# Patient Record
Sex: FEMALE | Race: WHITE | NOT HISPANIC OR LATINO | Employment: OTHER | URBAN - METROPOLITAN AREA
[De-identification: names, ages, dates, MRNs, and addresses within clinical notes are randomized per-mention and may not be internally consistent; named-entity substitution may affect disease eponyms.]

---

## 2024-06-05 ENCOUNTER — OFFICE VISIT (OUTPATIENT)
Dept: ORTHOPEDIC SURGERY | Facility: CLINIC | Age: 69
End: 2024-06-05
Payer: MEDICARE

## 2024-06-05 ENCOUNTER — CLINICAL SUPPORT (OUTPATIENT)
Dept: ORTHOPEDIC SURGERY | Facility: CLINIC | Age: 69
End: 2024-06-05
Payer: MEDICARE

## 2024-06-05 DIAGNOSIS — M79.672 FOOT PAIN, LEFT: ICD-10-CM

## 2024-06-05 DIAGNOSIS — S92.215A NONDISPLACED FRACTURE OF CUBOID BONE OF LEFT FOOT, INITIAL ENCOUNTER FOR CLOSED FRACTURE: Primary | ICD-10-CM

## 2024-06-05 PROCEDURE — 99204 OFFICE O/P NEW MOD 45 MIN: CPT | Performed by: FAMILY MEDICINE

## 2024-06-05 PROCEDURE — 1159F MED LIST DOCD IN RCRD: CPT | Performed by: FAMILY MEDICINE

## 2024-06-05 PROCEDURE — 1125F AMNT PAIN NOTED PAIN PRSNT: CPT | Performed by: FAMILY MEDICINE

## 2024-06-05 RX ORDER — ALBUTEROL SULFATE 90 UG/1
1 AEROSOL, METERED RESPIRATORY (INHALATION) AS NEEDED
COMMUNITY

## 2024-06-05 RX ORDER — VALACYCLOVIR HYDROCHLORIDE 1 G/1
500 TABLET, FILM COATED ORAL AS NEEDED
COMMUNITY
Start: 2023-10-24

## 2024-06-05 RX ORDER — CHLORHEXIDINE GLUCONATE ORAL RINSE 1.2 MG/ML
15 SOLUTION DENTAL AS NEEDED
COMMUNITY
Start: 2023-10-19

## 2024-06-05 RX ORDER — THEOPHYLLINE 600 MG/1
600 TABLET, EXTENDED RELEASE ORAL DAILY
COMMUNITY

## 2024-06-05 RX ORDER — THEOPHYLLINE 400 MG/1
400 TABLET, EXTENDED RELEASE ORAL DAILY
COMMUNITY
Start: 2024-03-29

## 2024-06-05 RX ORDER — LOSARTAN POTASSIUM 100 MG/1
100 TABLET ORAL DAILY
COMMUNITY
Start: 2024-04-24

## 2024-06-05 RX ORDER — HYDRALAZINE HYDROCHLORIDE 25 MG/1
25 TABLET, FILM COATED ORAL 2 TIMES DAILY
COMMUNITY

## 2024-06-05 RX ORDER — AMLODIPINE BESYLATE 10 MG/1
10 TABLET ORAL DAILY
COMMUNITY

## 2024-06-05 RX ORDER — NYSTATIN 100000 U/G
1 OINTMENT TOPICAL AS NEEDED
COMMUNITY

## 2024-06-05 RX ORDER — DULOXETIN HYDROCHLORIDE 60 MG/1
60 CAPSULE, DELAYED RELEASE ORAL DAILY
COMMUNITY

## 2024-06-05 RX ORDER — ACETAMINOPHEN 325 MG/1
325 TABLET ORAL AS NEEDED
COMMUNITY

## 2024-06-05 RX ORDER — ATORVASTATIN CALCIUM 20 MG/1
20 TABLET, FILM COATED ORAL NIGHTLY
COMMUNITY

## 2024-06-05 RX ORDER — FLUTICASONE PROPIONATE AND SALMETEROL XINAFOATE 230; 21 UG/1; UG/1
2 AEROSOL, METERED RESPIRATORY (INHALATION)
COMMUNITY

## 2024-06-05 RX ORDER — LORAZEPAM 2 MG/1
1 TABLET ORAL NIGHTLY
COMMUNITY

## 2024-06-05 RX ORDER — TRAZODONE HYDROCHLORIDE 100 MG/1
100 TABLET ORAL NIGHTLY
COMMUNITY

## 2024-06-05 ASSESSMENT — PAIN SCALES - GENERAL: PAINLEVEL_OUTOF10: 8

## 2024-06-05 ASSESSMENT — PAIN - FUNCTIONAL ASSESSMENT: PAIN_FUNCTIONAL_ASSESSMENT: 0-10

## 2024-06-05 ASSESSMENT — PAIN DESCRIPTION - DESCRIPTORS: DESCRIPTORS: ACHING;TENDER;OTHER (COMMENT)

## 2024-06-05 NOTE — PROGRESS NOTES
"NPV foot pain    History of Present Illness:  Encounter date: 06/05/2024  Juliet Rebollar is a 69 y.o. female who presents today for evaluation of left foot pain.  She states she injured her left foot on 5/1/2024 while in [place], stepped into a shower onto metal frame and had immediate pain over the plantar surface of her foot with subsequent bruising and swelling.  States she was going on a cruise, therefore did not seek any medical advice at that time and continued with her cruise.  She was able to ambulate, however had pain.  States swelling and bruising have resolved, and pain has improved since then, though still present with ambulation.  States she has pain primarily over plantar and lateral aspect of her foot with associated abnormal sensation described as feeling \"sticky\" over the bottom of her foot.  Denies any numbness, tingling, weakness, redness, warmth.  Denies any previous injury to this foot.  She has tried Tylenol and ice with some improvement.  Denies any other concerns at this time.    Review of Systems  Constitutional: no fever, no chills, not feeling tired, no recent weight gain and no recent weight loss.   ENT: no nosebleeds.   Cardiovascular: no chest pain.   Respiratory: no shortness of breath and no cough.   Gastrointestinal: no abdominal pain, no nausea, no diarrhea and no vomiting.   Musculoskeletal: as noted in HPI and no arthralgias.   Integumentary: no rashes and no skin wound.   Neurological: no headache.   Psychiatric: no sleep disturbances and no depression.   Endocrine: no muscle weakness and no muscle cramps.   Hematologic/Lymphatic: no swollen glands and no tendency for easy bruising.     Physical Exam:  NEURO: Sensation is intact in the bilateral lower extremities. Strength is grossly 5 out of 5 throughout the bilateral lower extremities, unless noted below.  GAIT: Non-antalgic  MUSCULOSKELETAL: Examination of the left foot: Ankle range of motion is full and pain-free. No obvious " swelling or ecchymosis. Strength of the ankle and foot is normal. Tenderness to palpation in the region of the plantar surface of the cuboid and mild TTP over distal posterior tibialis tendon. No tenderness to palpation over the Achilles tendon, plantar fascia, calcaneus, navicular, base of 5th, metatarsal shafts, intermetarsal spaces, metatarsal heads, 1st MTP joint. Negative calcaneal squeeze. Metatarsal squeeze is negative. No Carol click. No instability at first intermetatarsal space.    Constitutional: General appearance = Alert and in no acute distress. Well developed, well nourished.   Head and face: Normal.    Eyes: External Eye, Conjunctiva and Lids=Normal external exam; extraocular movements intact (EOMI).   Ears, Nose, Mouth, and Throat: External inspection of ears and nose=Normal.   Hearing= Normal.    Neck: No neck mass was observed. Supple.   Pulmonary: Respiratory effort = No respiratory distress.   Cardiovascular: Examination of extremities= No peripheral edema.   Skin and subcutaneous tissue: Normal skin color and pigmentation, normal skin turgor, and no rash; palpation of skin and subcutaneous tissue=Normal.    Neurologic: Sensation= Normal.    Psychiatric: Judgment and insight= Intact.  Orientation to person, place, and time: Alert and oriented x 3.  Mood and affect= Normal.     Imaging:     Radiographs of the left foot obtained today were reviewed and revealed well-healing nondisplaced fracture of the lateral aspect of the cuboid with mild degenerative changes.  The studies were reviewed with Dr. Lazo personally in the office today.    Problem List Items Addressed This Visit    None  Visit Diagnoses         Codes    Nondisplaced fracture of cuboid bone of left foot, initial encounter for closed fracture    -  Primary S92.215A    Foot pain, left     M79.672    Relevant Orders    XR foot left 1-2 views (Completed)          Patient Discussion/Summary  We reviewed the exam and x-ray findings and  discussed the conservative and surgical treatment options. We agreed to conservative management of subacute nondisplaced fracture of left cuboid.  X-ray shows callus formation with routine healing and she has had significant improvement since initial injury.  Therefore, recommend regular use of comfortable, wide toed footwear for comfort and expedite healing/improvement of pain.  She may continue to take prescription doses of Tylenol as needed along with ice as needed.  Discussed that this will take time to improve as we are only 4 weeks out from initial injury.  She may follow-up as needed if no improvement, any worsening, or if any new concerns arise.    **This note was dictated using Dragon speech recognition software and was not corrected for spelling or grammatical errors**     Brad Lazo M.D.  Clinical , Division of Sports Medicine  Primary Care Sports Medicine  Department of Orthopedic Surgery  [facility] [place]